# Patient Record
Sex: FEMALE | Race: WHITE | NOT HISPANIC OR LATINO
[De-identification: names, ages, dates, MRNs, and addresses within clinical notes are randomized per-mention and may not be internally consistent; named-entity substitution may affect disease eponyms.]

---

## 2021-04-01 DIAGNOSIS — K63.5 POLYP OF COLON: ICD-10-CM

## 2021-04-01 PROBLEM — Z00.00 ENCOUNTER FOR PREVENTIVE HEALTH EXAMINATION: Status: ACTIVE | Noted: 2021-04-01

## 2021-04-01 RX ORDER — SODIUM SULFATE, POTASSIUM SULFATE, MAGNESIUM SULFATE 17.5; 3.13; 1.6 G/ML; G/ML; G/ML
17.5-3.13-1.6 SOLUTION, CONCENTRATE ORAL
Qty: 1 | Refills: 0 | Status: ACTIVE | COMMUNITY
Start: 2021-04-01 | End: 1900-01-01

## 2021-04-19 ENCOUNTER — NON-APPOINTMENT (OUTPATIENT)
Age: 66
End: 2021-04-19

## 2021-04-30 ENCOUNTER — OUTPATIENT (OUTPATIENT)
Dept: OUTPATIENT SERVICES | Facility: HOSPITAL | Age: 66
LOS: 1 days | End: 2021-04-30

## 2021-04-30 VITALS
SYSTOLIC BLOOD PRESSURE: 160 MMHG | TEMPERATURE: 97 F | OXYGEN SATURATION: 98 % | WEIGHT: 190.04 LBS | DIASTOLIC BLOOD PRESSURE: 80 MMHG | HEIGHT: 67 IN | RESPIRATION RATE: 18 BRPM | HEART RATE: 67 BPM

## 2021-04-30 DIAGNOSIS — E03.9 HYPOTHYROIDISM, UNSPECIFIED: ICD-10-CM

## 2021-04-30 DIAGNOSIS — I10 ESSENTIAL (PRIMARY) HYPERTENSION: ICD-10-CM

## 2021-04-30 DIAGNOSIS — D12.7 BENIGN NEOPLASM OF RECTOSIGMOID JUNCTION: ICD-10-CM

## 2021-04-30 DIAGNOSIS — Z90.721 ACQUIRED ABSENCE OF OVARIES, UNILATERAL: Chronic | ICD-10-CM

## 2021-04-30 DIAGNOSIS — Z90.12 ACQUIRED ABSENCE OF LEFT BREAST AND NIPPLE: Chronic | ICD-10-CM

## 2021-04-30 DIAGNOSIS — Z20.822 CONTACT WITH AND (SUSPECTED) EXPOSURE TO COVID-19: ICD-10-CM

## 2021-04-30 LAB
A1C WITH ESTIMATED AVERAGE GLUCOSE RESULT: 5.2 % — SIGNIFICANT CHANGE UP (ref 4–5.6)
BLD GP AB SCN SERPL QL: NEGATIVE — SIGNIFICANT CHANGE UP
ESTIMATED AVERAGE GLUCOSE: 103 MG/DL — SIGNIFICANT CHANGE UP (ref 68–114)
RH IG SCN BLD-IMP: POSITIVE — SIGNIFICANT CHANGE UP

## 2021-04-30 RX ORDER — SODIUM CHLORIDE 9 MG/ML
1000 INJECTION, SOLUTION INTRAVENOUS
Refills: 0 | Status: DISCONTINUED | OUTPATIENT
Start: 2021-05-10 | End: 2021-05-10

## 2021-04-30 NOTE — H&P PST ADULT - NSANTHOSAYNRD_GEN_A_CORE
No. LAILA screening performed.  STOP BANG Legend: 0-2 = LOW Risk; 3-4 = INTERMEDIATE Risk; 5-8 = HIGH Risk

## 2021-04-30 NOTE — H&P PST ADULT - ACTIVITY
ADLs, house chores, strength training and cardio 2/xweek for 30 min, walks daily ADLs, house chores, strength training and cardio 2x/week for 30 min, walks daily

## 2021-04-30 NOTE — H&P PST ADULT - HISTORY OF PRESENT ILLNESS
66 yo female presents to [facility] for preop evaluation for laparoscopic low anterior resection.  Patient reports having a routine colonoscopy this month and noted to have polyp that was too large to be removed.  Patient diagnosed with benign neoplasm of rectosigmoid junction.  Patient denies abdominal pain, nausea, vomiting, diarrhea, constipation or hematochezia.

## 2021-04-30 NOTE — H&P PST ADULT - NSICDXPROBLEM_GEN_ALL_CORE_FT
PROBLEM DIAGNOSES  Problem: Benign neoplasm of rectosigmoid junction  Assessment and Plan: Patient scheduled for laparoscopic low anterior resection on 5/10/2021  Written & verbal preop instructions, gi prophylaxis & surgical soap given  Pt verbalized good understanding.  Teach back done on surgical soap instructions.   Patient has preop Medical evaluation with PCP, pending copy of report.  Patient with Shellfish and IV contrast allergy, OR booking notified     Problem: Hypertension  Assessment and Plan: Patient instructed to take Lisinoprol on AM of surgery with small sip of water     Problem: Hypothyroidism  Assessment and Plan: Patient instructed to take Levothyroxine on AM of surgery with a small sip of water     Problem: Encounter for screening laboratory testing for COVID-19 virus  Assessment and Plan: Patient aware of need for COVID testing prior to procedure and advised to co ordinate with surgeon.

## 2021-04-30 NOTE — H&P PST ADULT - NSICDXPASTMEDICALHX_GEN_ALL_CORE_FT
PAST MEDICAL HISTORY:  Benign neoplasm of rectosigmoid junction     Breast cancer     Deviated septum     Hypertension     Hypothyroidism

## 2021-04-30 NOTE — H&P PST ADULT - NEGATIVE NEUROLOGICAL SYMPTOMS
no transient paralysis/no paresthesias/no generalized seizures/no focal seizures/no syncope/no tremors/no vertigo/no loss of sensation/no difficulty walking/no headache/no loss of consciousness

## 2021-04-30 NOTE — H&P PST ADULT - NSICDXFAMILYHX_GEN_ALL_CORE_FT
FAMILY HISTORY:  Mother  Still living? Unknown  FH: breast cancer, Age at diagnosis: Age Unknown

## 2021-05-07 ENCOUNTER — APPOINTMENT (OUTPATIENT)
Dept: DISASTER EMERGENCY | Facility: CLINIC | Age: 66
End: 2021-05-07

## 2021-05-07 NOTE — ASU PATIENT PROFILE, ADULT - PMH
Benign neoplasm of rectosigmoid junction    Breast cancer    Deviated septum    Hypertension    Hypothyroidism

## 2021-05-08 LAB — SARS-COV-2 N GENE NPH QL NAA+PROBE: NOT DETECTED

## 2021-05-09 ENCOUNTER — TRANSCRIPTION ENCOUNTER (OUTPATIENT)
Age: 66
End: 2021-05-09

## 2021-05-10 ENCOUNTER — INPATIENT (INPATIENT)
Facility: HOSPITAL | Age: 66
LOS: 0 days | Discharge: HOME | End: 2021-05-11
Attending: SURGERY | Admitting: SURGERY
Payer: MEDICARE

## 2021-05-10 ENCOUNTER — RESULT REVIEW (OUTPATIENT)
Age: 66
End: 2021-05-10

## 2021-05-10 VITALS
RESPIRATION RATE: 18 BRPM | TEMPERATURE: 98 F | SYSTOLIC BLOOD PRESSURE: 153 MMHG | HEIGHT: 67 IN | OXYGEN SATURATION: 100 % | HEART RATE: 71 BPM | DIASTOLIC BLOOD PRESSURE: 99 MMHG | WEIGHT: 190.04 LBS

## 2021-05-10 DIAGNOSIS — Z90.721 ACQUIRED ABSENCE OF OVARIES, UNILATERAL: Chronic | ICD-10-CM

## 2021-05-10 DIAGNOSIS — D12.7 BENIGN NEOPLASM OF RECTOSIGMOID JUNCTION: ICD-10-CM

## 2021-05-10 DIAGNOSIS — Z90.12 ACQUIRED ABSENCE OF LEFT BREAST AND NIPPLE: Chronic | ICD-10-CM

## 2021-05-10 LAB — GLUCOSE BLDC GLUCOMTR-MCNC: 95 MG/DL — SIGNIFICANT CHANGE UP (ref 70–99)

## 2021-05-10 PROCEDURE — 88305 TISSUE EXAM BY PATHOLOGIST: CPT | Mod: 26

## 2021-05-10 RX ORDER — OMEGA-3 ACID ETHYL ESTERS 1 G
1 CAPSULE ORAL
Qty: 0 | Refills: 0 | DISCHARGE

## 2021-05-10 RX ORDER — CHLORHEXIDINE GLUCONATE 213 G/1000ML
1 SOLUTION TOPICAL ONCE
Refills: 0 | Status: COMPLETED | OUTPATIENT
Start: 2021-05-10 | End: 2021-05-10

## 2021-05-10 RX ORDER — ONDANSETRON 8 MG/1
4 TABLET, FILM COATED ORAL ONCE
Refills: 0 | Status: DISCONTINUED | OUTPATIENT
Start: 2021-05-10 | End: 2021-05-11

## 2021-05-10 RX ORDER — AMLODIPINE BESYLATE 2.5 MG/1
1 TABLET ORAL
Qty: 0 | Refills: 0 | DISCHARGE

## 2021-05-10 RX ORDER — CIPROFLOXACIN LACTATE 400MG/40ML
500 VIAL (ML) INTRAVENOUS EVERY 12 HOURS
Refills: 0 | Status: DISCONTINUED | OUTPATIENT
Start: 2021-05-10 | End: 2021-05-11

## 2021-05-10 RX ORDER — CHOLECALCIFEROL (VITAMIN D3) 125 MCG
1 CAPSULE ORAL
Qty: 0 | Refills: 0 | DISCHARGE

## 2021-05-10 RX ORDER — ENOXAPARIN SODIUM 100 MG/ML
40 INJECTION SUBCUTANEOUS DAILY
Refills: 0 | Status: DISCONTINUED | OUTPATIENT
Start: 2021-05-10 | End: 2021-05-10

## 2021-05-10 RX ORDER — LEVOTHYROXINE SODIUM 125 MCG
50 TABLET ORAL DAILY
Refills: 0 | Status: DISCONTINUED | OUTPATIENT
Start: 2021-05-10 | End: 2021-05-11

## 2021-05-10 RX ORDER — LISINOPRIL 2.5 MG/1
40 TABLET ORAL DAILY
Refills: 0 | Status: DISCONTINUED | OUTPATIENT
Start: 2021-05-10 | End: 2021-05-11

## 2021-05-10 RX ORDER — HYDROMORPHONE HYDROCHLORIDE 2 MG/ML
0.5 INJECTION INTRAMUSCULAR; INTRAVENOUS; SUBCUTANEOUS
Refills: 0 | Status: DISCONTINUED | OUTPATIENT
Start: 2021-05-10 | End: 2021-05-11

## 2021-05-10 RX ORDER — PROPRANOLOL HCL 160 MG
1 CAPSULE, EXTENDED RELEASE 24HR ORAL
Qty: 0 | Refills: 0 | DISCHARGE

## 2021-05-10 RX ORDER — AMLODIPINE BESYLATE 2.5 MG/1
5 TABLET ORAL DAILY
Refills: 0 | Status: DISCONTINUED | OUTPATIENT
Start: 2021-05-10 | End: 2021-05-11

## 2021-05-10 RX ORDER — LISINOPRIL 2.5 MG/1
1 TABLET ORAL
Qty: 0 | Refills: 0 | DISCHARGE

## 2021-05-10 RX ORDER — LEVOTHYROXINE SODIUM 125 MCG
1 TABLET ORAL
Qty: 0 | Refills: 0 | DISCHARGE

## 2021-05-10 RX ORDER — METRONIDAZOLE 500 MG
500 TABLET ORAL EVERY 8 HOURS
Refills: 0 | Status: DISCONTINUED | OUTPATIENT
Start: 2021-05-10 | End: 2021-05-11

## 2021-05-10 RX ADMIN — CHLORHEXIDINE GLUCONATE 1 APPLICATION(S): 213 SOLUTION TOPICAL at 09:41

## 2021-05-10 RX ADMIN — Medication 500 MILLIGRAM(S): at 22:02

## 2021-05-10 RX ADMIN — Medication 500 MILLIGRAM(S): at 19:29

## 2021-05-10 RX ADMIN — SODIUM CHLORIDE 30 MILLILITER(S): 9 INJECTION, SOLUTION INTRAVENOUS at 13:00

## 2021-05-10 RX ADMIN — Medication 500 MILLIGRAM(S): at 15:32

## 2021-05-10 RX ADMIN — SODIUM CHLORIDE 30 MILLILITER(S): 9 INJECTION, SOLUTION INTRAVENOUS at 09:41

## 2021-05-10 NOTE — ASU PREOP CHECKLIST - SELECT TESTS ORDERED
fs 95/CBC/Type and Cross/Type and Screen/POCT Blood Glucose/COVID-19 fs 95/BMP/CBC/Type and Cross/Type and Screen/POCT Blood Glucose/COVID-19

## 2021-05-10 NOTE — CHART NOTE - NSCHARTNOTEFT_GEN_A_CORE
POST-OPERATIVE NOTE    Patient is s/p transanal partial proctectomy and proctoplasty for rectal mass    Subjective:  Patient reports pain at the incisional site, controlled with medication  Denies chest pain, shortness of breath, nausea, vomiting  Is not yet passing gas or having bowel movements  Voided, tolerating clears    Vital Signs Last 24 Hrs  T(C): 36.6 (10 May 2021 17:46), Max: 36.7 (10 May 2021 08:46)  T(F): 97.8 (10 May 2021 17:46), Max: 98.1 (10 May 2021 08:46)  HR: 81 (10 May 2021 17:46) (60 - 81)  BP: 150/86 (10 May 2021 17:46) (110/60 - 153/99)  BP(mean): 84 (10 May 2021 14:15) (81 - 92)  RR: 16 (10 May 2021 17:46) (10 - 24)  SpO2: 98% (10 May 2021 17:46) (98% - 100%)  I&O's Detail    10 May 2021 07:01  -  10 May 2021 19:13  --------------------------------------------------------  IN:    Lactated Ringers: 120 mL  Total IN: 120 mL    OUT:    Voided (mL): 450 mL  Total OUT: 450 mL    Total NET: -330 mL        ciprofloxacin     Tablet 500  metroNIDAZOLE    Tablet 500  amLODIPine   Tablet 5  ciprofloxacin     Tablet 500  lisinopril 40  metroNIDAZOLE    Tablet 500  propranolol 10    PAST MEDICAL & SURGICAL HISTORY:  Hypertension    Hypothyroidism    Breast cancer    Benign neoplasm of rectosigmoid junction    Deviated septum    H/O left mastectomy    S/P right oophorectomy          Physical Exam:  General: NAD, resting comfortably in bed  Pulmonary: Nonlabored breathing, no respiratory distress, CTAB  Cardiovascular: NSR, no murmurs or rubs  Abdominal: Area packed without obvious bleeding  Extremities: WWP      LABS:            CAPILLARY BLOOD GLUCOSE      POCT Blood Glucose.: 95 mg/dL (10 May 2021 08:50)      Radiology and Additional Studies:    Assessment:  The patient is a 65y Female who is now several hours post-op from a transanal partial proctectomy and proctoplasty for rectal mass    Plan:  - Pain control as needed  - tolerating diet  - DVT ppx  - OOB and ambulating as tolerated  - F/u AM labs    Adeel, PGY1

## 2021-05-10 NOTE — ASU PREOP CHECKLIST - COMMENTS
sip of water with morning meds  - famotidine, lisinipril , norvasc, propanolol sip of water with morning meds  - famotidine, lisinipril , norvasc, inderal

## 2021-05-10 NOTE — BRIEF OPERATIVE NOTE - NSICDXBRIEFPROCEDURE_GEN_ALL_CORE_FT
PROCEDURES:  Transanal polypectomy 10-May-2021 12:57:12  Leila Johnson  Proctoplasty, mucosal 10-May-2021 12:57:31  Leila Johnson

## 2021-05-11 ENCOUNTER — TRANSCRIPTION ENCOUNTER (OUTPATIENT)
Age: 66
End: 2021-05-11

## 2021-05-11 VITALS — SYSTOLIC BLOOD PRESSURE: 141 MMHG | HEART RATE: 67 BPM | DIASTOLIC BLOOD PRESSURE: 83 MMHG

## 2021-05-11 LAB
ANION GAP SERPL CALC-SCNC: 14 MMOL/L — SIGNIFICANT CHANGE UP (ref 7–14)
BUN SERPL-MCNC: 7 MG/DL — SIGNIFICANT CHANGE UP (ref 7–23)
CALCIUM SERPL-MCNC: 9.5 MG/DL — SIGNIFICANT CHANGE UP (ref 8.4–10.5)
CHLORIDE SERPL-SCNC: 100 MMOL/L — SIGNIFICANT CHANGE UP (ref 98–107)
CO2 SERPL-SCNC: 22 MMOL/L — SIGNIFICANT CHANGE UP (ref 22–31)
COVID-19 SPIKE DOMAIN AB INTERP: NEGATIVE — SIGNIFICANT CHANGE UP
COVID-19 SPIKE DOMAIN ANTIBODY RESULT: 0.4 U/ML — SIGNIFICANT CHANGE UP
CREAT SERPL-MCNC: 0.61 MG/DL — SIGNIFICANT CHANGE UP (ref 0.5–1.3)
GLUCOSE SERPL-MCNC: 130 MG/DL — HIGH (ref 70–99)
HCT VFR BLD CALC: 42.5 % — SIGNIFICANT CHANGE UP (ref 34.5–45)
HGB BLD-MCNC: 14.3 G/DL — SIGNIFICANT CHANGE UP (ref 11.5–15.5)
MAGNESIUM SERPL-MCNC: 2 MG/DL — SIGNIFICANT CHANGE UP (ref 1.6–2.6)
MCHC RBC-ENTMCNC: 29.8 PG — SIGNIFICANT CHANGE UP (ref 27–34)
MCHC RBC-ENTMCNC: 33.6 GM/DL — SIGNIFICANT CHANGE UP (ref 32–36)
MCV RBC AUTO: 88.5 FL — SIGNIFICANT CHANGE UP (ref 80–100)
NRBC # BLD: 0 /100 WBCS — SIGNIFICANT CHANGE UP
NRBC # FLD: 0 K/UL — SIGNIFICANT CHANGE UP
PHOSPHATE SERPL-MCNC: 3 MG/DL — SIGNIFICANT CHANGE UP (ref 2.5–4.5)
PLATELET # BLD AUTO: 230 K/UL — SIGNIFICANT CHANGE UP (ref 150–400)
POTASSIUM SERPL-MCNC: 3.8 MMOL/L — SIGNIFICANT CHANGE UP (ref 3.5–5.3)
POTASSIUM SERPL-SCNC: 3.8 MMOL/L — SIGNIFICANT CHANGE UP (ref 3.5–5.3)
RBC # BLD: 4.8 M/UL — SIGNIFICANT CHANGE UP (ref 3.8–5.2)
RBC # FLD: 13 % — SIGNIFICANT CHANGE UP (ref 10.3–14.5)
SARS-COV-2 IGG+IGM SERPL QL IA: 0.4 U/ML — SIGNIFICANT CHANGE UP
SARS-COV-2 IGG+IGM SERPL QL IA: NEGATIVE — SIGNIFICANT CHANGE UP
SODIUM SERPL-SCNC: 136 MMOL/L — SIGNIFICANT CHANGE UP (ref 135–145)
WBC # BLD: 5.16 K/UL — SIGNIFICANT CHANGE UP (ref 3.8–10.5)
WBC # FLD AUTO: 5.16 K/UL — SIGNIFICANT CHANGE UP (ref 3.8–10.5)

## 2021-05-11 RX ORDER — OXYCODONE HYDROCHLORIDE 5 MG/1
5 TABLET ORAL EVERY 4 HOURS
Refills: 0 | Status: DISCONTINUED | OUTPATIENT
Start: 2021-05-11 | End: 2021-05-11

## 2021-05-11 RX ORDER — POTASSIUM CHLORIDE 20 MEQ
20 PACKET (EA) ORAL ONCE
Refills: 0 | Status: COMPLETED | OUTPATIENT
Start: 2021-05-11 | End: 2021-05-11

## 2021-05-11 RX ADMIN — LISINOPRIL 40 MILLIGRAM(S): 2.5 TABLET ORAL at 05:51

## 2021-05-11 RX ADMIN — Medication 20 MILLIEQUIVALENT(S): at 10:45

## 2021-05-11 RX ADMIN — Medication 500 MILLIGRAM(S): at 08:47

## 2021-05-11 RX ADMIN — Medication 50 MICROGRAM(S): at 08:47

## 2021-05-11 RX ADMIN — Medication 500 MILLIGRAM(S): at 05:51

## 2021-05-11 NOTE — DISCHARGE NOTE PROVIDER - NSDCCPCAREPLAN_GEN_ALL_CORE_FT
PRINCIPAL DISCHARGE DIAGNOSIS  Diagnosis: Benign neoplasm of rectosigmoid junction  Assessment and Plan of Treatment: WOUND CARE:  Please keep incisions clean and dry. Please do not Scrub or rub incisions. Do not use lotion or powder on incisions.   BATHING: You may shower and/or sponge bathe. You may use warm soapy water in the shower and rinse, pat dry.  ACTIVITY: No heavy lifting or straining. Otherwise, you may return to your usual level of physical activity. If you are taking narcotic pain medication DO NOT drive a car, operate machinery or make important decisions.  DIET: low fiber diet  NOTIFY YOUR SURGEON IF YOU HAVE: any bleeding that does not stop, any pus draining from your wound(s), any fever (over 100.4 F) persistent nausea/vomiting, or if your pain is not controlled on your discharge pain medications, unable to urinate.  Please follow up with your primary care physician in one week regarding your hospitalization, bring copies of your discharge paperwork.  Please follow up with your surgeon, Dr. Hernandez as an outpatient, please call to schedule appointment

## 2021-05-11 NOTE — DISCHARGE NOTE PROVIDER - CARE PROVIDER_API CALL
Bryan Hernandez)  ColonRectal Surgery; Surgery  [address] [facility], [address]  [place] [address]  Phone: [phone]  Fax: [phone]  Follow Up Time:

## 2021-05-11 NOTE — DISCHARGE NOTE PROVIDER - NSDCMRMEDTOKEN_GEN_ALL_CORE_FT
Fish Oil oral capsule: 1 cap(s) orally once a day, last dose 5/3/2021  Inderal 10 mg oral tablet: 1 tab(s) orally 2 times a day  levothyroxine 50 mcg (0.05 mg) oral tablet: 1 tab(s) orally once a day  lisinopril 40 mg oral tablet: 1 tab(s) orally once a day  Norvasc 5 mg oral tablet: 1 tab(s) orally once a day  Vitamin D3: 1 tab(s) orally once a day   Augmentin 875 mg-125 mg oral tablet: 1 tab(s) orally 3 times a day for 5 days  Fish Oil oral capsule: 1 cap(s) orally once a day, last dose 5/3/2021  Inderal 10 mg oral tablet: 1 tab(s) orally 2 times a day  levothyroxine 50 mcg (0.05 mg) oral tablet: 1 tab(s) orally once a day  lisinopril 40 mg oral tablet: 1 tab(s) orally once a day  Norvasc 5 mg oral tablet: 1 tab(s) orally once a day  Vitamin D3: 1 tab(s) orally once a day

## 2021-05-11 NOTE — DISCHARGE NOTE NURSING/CASE MANAGEMENT/SOCIAL WORK - PATIENT PORTAL LINK FT
You can access the FollowMyHealth Patient Portal offered by [facility] by registering at the following website: [email or web address]. By joining [email or web address] FollowMyHealth portal, you will also be able to view your health information using other applications (apps) compatible with our system.

## 2021-05-11 NOTE — PROGRESS NOTE ADULT - SUBJECTIVE AND OBJECTIVE BOX
24h Events:   - Overnight, no acute events    Subjective:   Patient examined at bedside this AM.     Objective:  Vital Signs  T(C): 36.4 (05-10 @ 22:38), Max: 36.7 (05-10 @ 08:46)  HR: 59 (05-10 @ 22:38) (59 - 81)  BP: 125/74 (05-10 @ 22:38) (110/60 - 153/99)  RR: 17 (05-10 @ 22:38) (10 - 24)  SpO2: 99% (05-10 @ 22:38) (98% - 100%)  05-10-21 @ 07:01  -  05-11-21 @ 00:41  --------------------------------------------------------  IN:  Total IN: 0 mL    OUT:    Voided (mL): 1000 mL  Total OUT: 1000 mL    Total NET: -1000 mL          Physical Exam:  GEN: resting in bed comfortably in NAD  RESP: no increased WOB  ABD: soft, non-distended, non-tender without rebound tenderness or guarding, incisions c/d/i  EXTREM: Symmetric, moves all extremities  NEURO: AAOx4    Labs:        CAPILLARY BLOOD GLUCOSE      POCT Blood Glucose.: 95 mg/dL (10 May 2021 08:50)      Medications:   MEDICATIONS  (STANDING):  amLODIPine   Tablet 5 milliGRAM(s) Oral daily  ciprofloxacin     Tablet 500 milliGRAM(s) Oral every 12 hours  levothyroxine 50 MICROGram(s) Oral daily  lisinopril 40 milliGRAM(s) Oral daily  metroNIDAZOLE    Tablet 500 milliGRAM(s) Oral every 8 hours  propranolol 10 milliGRAM(s) Oral two times a day    MEDICATIONS  (PRN):  HYDROmorphone  Injectable 0.5 milliGRAM(s) IV Push every 10 minutes PRN Severe Pain (7 - 10)  ondansetron Injectable 4 milliGRAM(s) IV Push once PRN Nausea and/or Vomiting      Assessment:   64yo Female presents for Lap LAR after a finding of a polyp that was too large to remove on colonoscopy. Patient diagnosed with benign neoplasm of rectosigmoid junction.  Patient now s/p transanal excision of rectal mass with proctoplasty.    LRD  Cipro/Flagyl  Vitals  Monitor UOP  OOB    A team [id number]   24h Events:   - Overnight, no acute events    Subjective:   Patient examined at bedside this AM.     Objective:  Vital Signs  T(C): 36.4 (05-10 @ 22:38), Max: 36.7 (05-10 @ 08:46)  HR: 59 (05-10 @ 22:38) (59 - 81)  BP: 125/74 (05-10 @ 22:38) (110/60 - 153/99)  RR: 17 (05-10 @ 22:38) (10 - 24)  SpO2: 99% (05-10 @ 22:38) (98% - 100%)  05-10-21 @ 07:01  -  05-11-21 @ 00:41  --------------------------------------------------------  IN:  Total IN: 0 mL    OUT:    Voided (mL): 1000 mL  Total OUT: 1000 mL    Total NET: -1000 mL          Physical Exam:  GEN: resting in bed comfortably in NAD  RESP: no increased WOB  ABD: soft, non-distended, non-tender without rebound tenderness or guarding, incisions c/d/i  EXTREM: Symmetric, moves all extremities  NEURO: AAOx4    Labs:        CAPILLARY BLOOD GLUCOSE      POCT Blood Glucose.: 95 mg/dL (10 May 2021 08:50)      Medications:   MEDICATIONS  (STANDING):  amLODIPine   Tablet 5 milliGRAM(s) Oral daily  ciprofloxacin     Tablet 500 milliGRAM(s) Oral every 12 hours  levothyroxine 50 MICROGram(s) Oral daily  lisinopril 40 milliGRAM(s) Oral daily  metroNIDAZOLE    Tablet 500 milliGRAM(s) Oral every 8 hours  propranolol 10 milliGRAM(s) Oral two times a day    MEDICATIONS  (PRN):  HYDROmorphone  Injectable 0.5 milliGRAM(s) IV Push every 10 minutes PRN Severe Pain (7 - 10)  ondansetron Injectable 4 milliGRAM(s) IV Push once PRN Nausea and/or Vomiting       24h Events:   - Overnight, no acute events    Subjective:   Patient examined at bedside this AM. Pt tolerated diet overnight. Pt denied chest pain, SOB, fevers/chills, N/V/D/C    Objective:  Vital Signs  T(C): 36.4 (05-10 @ 22:38), Max: 36.7 (05-10 @ 08:46)  HR: 59 (05-10 @ 22:38) (59 - 81)  BP: 125/74 (05-10 @ 22:38) (110/60 - 153/99)  RR: 17 (05-10 @ 22:38) (10 - 24)  SpO2: 99% (05-10 @ 22:38) (98% - 100%)  05-10-21 @ 07:01  -  05-11-21 @ 00:41  --------------------------------------------------------  IN:  Total IN: 0 mL    OUT:    Voided (mL): 1000 mL  Total OUT: 1000 mL    Total NET: -1000 mL    Physical Exam:  GEN: resting in bed comfortably in NAD  RESP: no increased WOB  ABD: soft, non-distended, non-tender without rebound tenderness or guarding, incisions c/d/i  EXTREM: Symmetric, moves all extremities  NEURO: AAOx4    Labs:        CAPILLARY BLOOD GLUCOSE      POCT Blood Glucose.: 95 mg/dL (10 May 2021 08:50)      Medications:   MEDICATIONS  (STANDING):  amLODIPine   Tablet 5 milliGRAM(s) Oral daily  ciprofloxacin     Tablet 500 milliGRAM(s) Oral every 12 hours  levothyroxine 50 MICROGram(s) Oral daily  lisinopril 40 milliGRAM(s) Oral daily  metroNIDAZOLE    Tablet 500 milliGRAM(s) Oral every 8 hours  propranolol 10 milliGRAM(s) Oral two times a day    MEDICATIONS  (PRN):  HYDROmorphone  Injectable 0.5 milliGRAM(s) IV Push every 10 minutes PRN Severe Pain (7 - 10)  ondansetron Injectable 4 milliGRAM(s) IV Push once PRN Nausea and/or Vomiting

## 2021-05-11 NOTE — DISCHARGE NOTE PROVIDER - HOSPITAL COURSE
64 yo female underwent routine colonoscopy and was noted to have a polyp that was too large to be removed.  Patient diagnosed with benign neoplasm of rectosigmoid junction.  Patient denies abdominal pain, nausea, vomiting, diarrhea, constipation or hematochezia.  5/10 pt admitted to surgical service and taken to the OR for scheduled transanal partial proctectomy and proctoplasty for rectal mass. Pt tolerated procedure well. Pt's diet slowly advanced as tolerated, when tolerating diet transitioned to oral medications.   Per Attending pt now stable for discharge home. Pt tolerating diet and pain well controlled. Pt to follow up with Dr Hernandez as an outpatient, instructed to call to schedule appointment

## 2021-05-11 NOTE — PROGRESS NOTE ADULT - ATTENDING COMMENTS
Pt seen/examined, overall doing great. Denies rectal bleeding or pain.    - d/c home on Augmentin 5d  - f/u with Dr. Hernandez

## 2021-05-11 NOTE — PROGRESS NOTE ADULT - ASSESSMENT
Assessment: 66yo Female presents for Lap LAR after a finding of a polyp that was too large to remove on colonoscopy. Patient diagnosed with benign neoplasm of rectosigmoid junction.  Patient now s/p transanal excision of rectal mass with proctoplasty.    - MIRZA  - Cipro/Flagyl  - DARRELL  - Dispo: Home    A-Team Surgery  [id number]

## 2021-05-13 LAB — SURGICAL PATHOLOGY STUDY: SIGNIFICANT CHANGE UP

## 2021-05-16 ENCOUNTER — TRANSCRIPTION ENCOUNTER (OUTPATIENT)
Age: 66
End: 2021-05-16

## 2021-05-23 ENCOUNTER — APPOINTMENT (OUTPATIENT)
Dept: DISASTER EMERGENCY | Facility: CLINIC | Age: 66
End: 2021-05-23

## 2021-05-23 DIAGNOSIS — Z01.818 ENCOUNTER FOR OTHER PREPROCEDURAL EXAMINATION: ICD-10-CM

## 2021-05-26 ENCOUNTER — APPOINTMENT (OUTPATIENT)
Dept: GASTROENTEROLOGY | Facility: HOSPITAL | Age: 66
End: 2021-05-26

## 2024-01-06 ENCOUNTER — NON-APPOINTMENT (OUTPATIENT)
Age: 69
End: 2024-01-06

## 2024-02-20 NOTE — H&P PST ADULT - NS PRO FEM REPRO HEALTH SCREEN
"Subjective   Patient ID: Carol Sanon is a 70 y.o. female who presents for FUV.    Pt feels better  Pt ear and cough are better           Review of Systems   All other systems reviewed and are negative.      Objective   /80   Pulse 91   Temp 36.6 °C (97.9 °F)   Ht 1.6 m (5' 3\")   Wt 87.5 kg (193 lb)   SpO2 99%   BMI 34.19 kg/m²     Physical Exam  Constitutional:       Appearance: Normal appearance.   HENT:      Head: Normocephalic and atraumatic.      Right Ear: Tympanic membrane, ear canal and external ear normal.      Left Ear: Tympanic membrane, ear canal and external ear normal.      Nose: Nose normal.      Mouth/Throat:      Mouth: Mucous membranes are moist.      Pharynx: Oropharynx is clear.   Eyes:      Extraocular Movements: Extraocular movements intact.      Conjunctiva/sclera: Conjunctivae normal.      Pupils: Pupils are equal, round, and reactive to light.   Cardiovascular:      Rate and Rhythm: Normal rate and regular rhythm.      Pulses: Normal pulses.      Heart sounds: Normal heart sounds.   Pulmonary:      Effort: Pulmonary effort is normal.      Breath sounds: Normal breath sounds.   Abdominal:      General: Abdomen is flat. Bowel sounds are normal.      Palpations: Abdomen is soft.   Genitourinary:     Comments: nl  Musculoskeletal:         General: Normal range of motion.      Cervical back: Normal range of motion and neck supple.   Skin:     General: Skin is warm and dry.      Capillary Refill: Capillary refill takes less than 2 seconds.   Neurological:      General: No focal deficit present.      Mental Status: She is alert and oriented to person, place, and time.   Psychiatric:         Mood and Affect: Mood normal.         Behavior: Behavior normal.         Thought Content: Thought content normal.         Assessment/Plan   Diagnoses and all orders for this visit:  Chest pain, unspecified type    resolved     "
mammogram

## 2025-06-16 NOTE — ASU PREOP CHECKLIST - INTERNAL PROSTHESES
A1c 8.5  Takes Lantus 22u in AM and 28u in PM.  c/w Lantus 32u QHS   added premeal admelog 2/4/4 units   Fs better controlled  Monitor FS no